# Patient Record
Sex: FEMALE | Race: WHITE | Employment: FULL TIME
[De-identification: names, ages, dates, MRNs, and addresses within clinical notes are randomized per-mention and may not be internally consistent; named-entity substitution may affect disease eponyms.]

---

## 2017-07-08 ENCOUNTER — HEALTH MAINTENANCE LETTER (OUTPATIENT)
Age: 52
End: 2017-07-08

## 2019-02-10 ENCOUNTER — HOSPITAL ENCOUNTER (EMERGENCY)
Facility: CLINIC | Age: 54
Discharge: HOME | End: 2019-02-10
Attending: NURSE PRACTITIONER | Admitting: NURSE PRACTITIONER
Payer: COMMERCIAL

## 2019-02-10 VITALS
TEMPERATURE: 98.1 F | DIASTOLIC BLOOD PRESSURE: 88 MMHG | WEIGHT: 220 LBS | OXYGEN SATURATION: 98 % | BODY MASS INDEX: 37.56 KG/M2 | HEIGHT: 64 IN | SYSTOLIC BLOOD PRESSURE: 142 MMHG | RESPIRATION RATE: 16 BRPM

## 2019-02-10 DIAGNOSIS — S01.01XA LACERATION OF SCALP, INITIAL ENCOUNTER: ICD-10-CM

## 2019-02-10 PROCEDURE — G0463 HOSPITAL OUTPT CLINIC VISIT: HCPCS | Performed by: NURSE PRACTITIONER

## 2019-02-10 PROCEDURE — 12002 RPR S/N/AX/GEN/TRNK2.6-7.5CM: CPT | Performed by: NURSE PRACTITIONER

## 2019-02-10 PROCEDURE — 99212 OFFICE O/P EST SF 10 MIN: CPT | Mod: 25 | Performed by: NURSE PRACTITIONER

## 2019-02-10 PROCEDURE — 12002 RPR S/N/AX/GEN/TRNK2.6-7.5CM: CPT | Mod: Z6 | Performed by: NURSE PRACTITIONER

## 2019-02-10 RX ORDER — TRAZODONE HYDROCHLORIDE 50 MG/1
50-100 TABLET, FILM COATED ORAL
COMMUNITY
Start: 2018-02-15

## 2019-02-10 ASSESSMENT — ENCOUNTER SYMPTOMS
DIZZINESS: 0
LIGHT-HEADEDNESS: 0
WOUND: 1
VOMITING: 0
NAUSEA: 0
HEADACHES: 0

## 2019-02-10 ASSESSMENT — MIFFLIN-ST. JEOR: SCORE: 1592.91

## 2019-02-10 NOTE — ED AVS SNAPSHOT
[facility] Emergency Department  [address] [address]  Phone:  [phone]  Fax:  [phone]                                    Melyssa Murray   MRN: [id number]    Department:  [facility] Emergency Department   Date of Visit:  2/10/2019           After Visit Summary Signature Page    I have received my discharge instructions, and my questions have been answered. I have discussed any challenges I see with this plan with the nurse or doctor.    ..........................................................................................................................................  Patient/Patient Representative Signature      ..........................................................................................................................................  Patient Representative Print Name and Relationship to Patient    ..................................................               ................................................  Date                                   Time    ..........................................................................................................................................  Reviewed by Signature/Title    ...................................................              ..............................................  Date                                               Time          22EPIC Rev 08/18

## 2019-02-10 NOTE — ED PROVIDER NOTES
History     Chief Complaint   Patient presents with     Laceration     head,      HPI  Melyssa Murray is a 53 year old female who presents to urgent care for evaluation of scalp laceration.  Patient was carrying a cracked pot of soup and slipped on her for falling.  The crockpot broke and a piece of the crockpot hit her in the left side of the head causing a laceration.  Bleeding is controlled.  Tetanus is up-to-date.    Allergies:  Allergies   Allergen Reactions     Sulfa Drugs Nausea and Vomiting       Problem List:    Patient Active Problem List    Diagnosis Date Noted     CARDIOVASCULAR SCREENING; LDL GOAL LESS THAN 160 10/31/2010     Priority: Medium     Chronic cholecystitis 07/15/2008     Priority: Medium     Major depressive disorder, recurrent, moderate (H) 06/20/2008     Priority: Medium     OSTEOARTHROS NOS-PELVIS 10/31/2007     Priority: Medium     Has seen Dr. Ram from [facility] 10/24/07  Plan to get MRI to rule out avascular necrosis.   Also started Mobic 7.5mg daily.       Postherpetic trigeminal neuralgia 01/29/2007     Priority: Medium     decreased sensation in mandibular distribution of the trigeminal nerve       Ovarian cyst 01/15/2007     Priority: Medium     Ovarian cyst noted on CT abdomen.  Dr Sampson, radiologist reccomended follow up ultrasound  Dr. Sampson called stating that the radiologist confirms patients left ovary needs to be worked-up, the least due an ultrasound in 30 days.  Believes the cyst should be removed.    Problem list name updated by automated process. Provider to review       Other bursitis disorders 01/15/2007     Priority: Medium     12/26/06   Left iliopsoas bursitis. Cystic-appearing change along the    anterior aspect of the right acetabular region superiorly, felt to    most likely represent dissected iliopsoas bursitis. However this could    also represent ganglion cyst formation.    2. 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 Provider to review       FAMILY HX GI MALIGNANCY 09/06/2006     Priority: Medium     PGF COLON CANCER - ? FATHER SCREENING       Family history of malignant neoplasm of breast 09/06/2006     Priority: Medium     Mother age 46       Personal history of contraception, presenting hazards to health 09/06/2006     Priority: Medium     Tubal         Premenstrual tension syndrome 12/27/2005     Priority: Medium     Problem list name updated by automated process. Provider to review          Past Medical History:    Past Medical History:   Diagnosis Date     Depressive disorder, not elsewhere classified      Other psoriasis      Unspecified essential hypertension      Unspecified otitis media        Past Surgical History:    Past Surgical History:   Procedure Laterality Date     C APPENDECTOMY  1983      LAPAROSCOPY, SURGICAL; CHOLECYSTECTOMY  7/21/2008      REMOVE TONSILS/ADENOIDS,12+ Y/O  1981     HERNIA REPAIR, INCISIONAL  12-12-02     TUBAL LIGATION  2000       Family History:    Family History   Problem Relation Age of Onset     Breast Cancer Mother          age 46      Hypertension Father      Arthritis Father         psoriatic     Cancer Father         stomach and intestinal     Depression Father      Cancer Maternal Grandmother      Cancer Maternal Grandfather      Cancer Paternal Grandmother      Cancer Paternal Grandfather        Social History:  Marital Status:   [2]  Social History     Tobacco Use     Smoking status: Former Smoker     Years: 5.00     Types: Cigarettes     Tobacco comment: quit in 1990   Substance Use Topics     Alcohol use: Yes     Comment:  "occasionally     Drug use: No        Medications:      traZODone (DESYREL) 50 MG tablet   FLUOXETINE HCL 40 MG OR CAPS         Review of Systems   Gastrointestinal: Negative for nausea and vomiting.   Skin: Positive for wound.   Neurological: Negative for dizziness, syncope, light-headedness and headaches.       Physical Exam   BP: 142/88  Heart Rate: 71  Temp: 98.1  F (36.7  C)  Resp: 16  Height: 162.6 cm (5' 4\")  Weight: 99.8 kg (220 lb)  SpO2: 98 %      Physical Exam  Appearance: Alert, oriented, no acute distress.  Skin: Laceration 3 cm noted to the left scalp.  Description: clean wound edges, no foreign bodies. Neurovascular and tendon structures are intact.      ED Course        Laceration repair  Date/Time: 2/10/2019 5:17 PM  Performed by: Carmen Gar APRN CNP  Authorized by: Carmen Gar APRN CNP   Body area: head/neck  Location details: scalp  Laceration length: 3 cm  Foreign bodies: no foreign bodies  Preparation: Patient was prepped and draped in the usual sterile fashion.  Irrigation solution: saline  Amount of cleaning: standard  Skin closure: staples  Number of sutures: 3 (staples)  Technique: simple  Approximation: close  Approximation difficulty: simple  Patient tolerance: Patient tolerated the procedure well with no immediate complications                   No results found for this or any previous visit (from the past 24 hour(s)).    Medications - No data to display    Assessments & Plan (with Medical Decision Making)   Scalp laceration as noted above. Repaired as noted above with 3 staples. Instructed to have staples removed in 5 days. Worrisome reasons to return sooner discussed.     I have reviewed the nursing notes.    I have reviewed the findings, diagnosis, plan and need for follow up with the patient.         Medication List      There are no discharge medications for this visit.         Final diagnoses:   Laceration of scalp, initial encounter       2/10/2019 "   [facility] EMERGENCY DEPARTMENT     Carmen Gar APRN CNP  02/10/19 [id number]

## 2019-11-03 ENCOUNTER — HEALTH MAINTENANCE LETTER (OUTPATIENT)
Age: 54
End: 2019-11-03

## 2020-11-16 ENCOUNTER — HEALTH MAINTENANCE LETTER (OUTPATIENT)
Age: 55
End: 2020-11-16

## 2021-02-07 ENCOUNTER — HEALTH MAINTENANCE LETTER (OUTPATIENT)
Age: 56
End: 2021-02-07

## 2021-09-12 ENCOUNTER — HEALTH MAINTENANCE LETTER (OUTPATIENT)
Age: 56
End: 2021-09-12

## 2022-01-02 ENCOUNTER — HEALTH MAINTENANCE LETTER (OUTPATIENT)
Age: 57
End: 2022-01-02

## 2022-11-19 ENCOUNTER — HEALTH MAINTENANCE LETTER (OUTPATIENT)
Age: 57
End: 2022-11-19

## 2023-04-09 ENCOUNTER — HEALTH MAINTENANCE LETTER (OUTPATIENT)
Age: 58
End: 2023-04-09